# Patient Record
Sex: MALE | HISPANIC OR LATINO | Employment: FULL TIME | URBAN - METROPOLITAN AREA
[De-identification: names, ages, dates, MRNs, and addresses within clinical notes are randomized per-mention and may not be internally consistent; named-entity substitution may affect disease eponyms.]

---
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
## 2019-02-14 ENCOUNTER — HOSPITAL ENCOUNTER (EMERGENCY)
Facility: MEDICAL CENTER | Age: 19
End: 2019-02-14
Attending: EMERGENCY MEDICINE
Payer: COMMERCIAL

## 2019-02-14 ENCOUNTER — APPOINTMENT (OUTPATIENT)
Dept: RADIOLOGY | Facility: MEDICAL CENTER | Age: 19
End: 2019-02-14
Attending: EMERGENCY MEDICINE
Payer: COMMERCIAL

## 2019-02-14 VITALS
TEMPERATURE: 97.3 F | HEIGHT: 65 IN | OXYGEN SATURATION: 99 % | SYSTOLIC BLOOD PRESSURE: 134 MMHG | HEART RATE: 58 BPM | RESPIRATION RATE: 16 BRPM | BODY MASS INDEX: 31.65 KG/M2 | DIASTOLIC BLOOD PRESSURE: 76 MMHG | WEIGHT: 190 LBS
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
## 2019-02-14 PROCEDURE — 70487 CT MAXILLOFACIAL W/DYE: CPT

## 2019-02-14 PROCEDURE — 307744 HCHG RED TRAUMA TEAM SERVICES

## 2019-02-14 PROCEDURE — 70250 X-RAY EXAM OF SKULL: CPT

## 2019-02-14 PROCEDURE — 700117 HCHG RX CONTRAST REV CODE 255: Performed by: EMERGENCY MEDICINE

## 2019-02-14 PROCEDURE — 99284 EMERGENCY DEPT VISIT MOD MDM: CPT

## 2019-02-14 RX ORDER — AMOXICILLIN AND CLAVULANATE POTASSIUM 875; 125 MG/1; MG/1
1 TABLET, FILM COATED ORAL 2 TIMES DAILY
Qty: 6 TAB | Refills: 0 | Status: SHIPPED | OUTPATIENT
Start: 2019-02-14 | End: 2019-02-17

## 2019-02-14 RX ADMIN — IOHEXOL 80 ML: 350 INJECTION, SOLUTION INTRAVENOUS at 20:00
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
## 2019-02-15 NOTE — ED NOTES
Patient stable w no ss of distress at this time. Discharge instructions reviewed and understanding verbalized. abx ointment placed to wound per MD order. Ready for discharge.
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
## 2021-05-28 ENCOUNTER — HOSPITAL ENCOUNTER (EMERGENCY)
Facility: MEDICAL CENTER | Age: 21
End: 2021-05-28
Attending: EMERGENCY MEDICINE
Payer: COMMERCIAL

## 2021-05-28 VITALS
RESPIRATION RATE: 18 BRPM | SYSTOLIC BLOOD PRESSURE: 135 MMHG | TEMPERATURE: 97.7 F | BODY MASS INDEX: 23.77 KG/M2 | HEART RATE: 85 BPM | WEIGHT: 147.93 LBS | HEIGHT: 66 IN | DIASTOLIC BLOOD PRESSURE: 74 MMHG | OXYGEN SATURATION: 98 %

## 2021-05-28 DIAGNOSIS — S61.012A LACERATION OF LEFT THUMB WITHOUT FOREIGN BODY WITHOUT DAMAGE TO NAIL, INITIAL ENCOUNTER: ICD-10-CM

## 2021-05-28 PROCEDURE — 90471 IMMUNIZATION ADMIN: CPT

## 2021-05-28 PROCEDURE — 99282 EMERGENCY DEPT VISIT SF MDM: CPT

## 2021-05-28 PROCEDURE — 700111 HCHG RX REV CODE 636 W/ 250 OVERRIDE (IP): Performed by: EMERGENCY MEDICINE

## 2021-05-28 PROCEDURE — 90715 TDAP VACCINE 7 YRS/> IM: CPT | Performed by: EMERGENCY MEDICINE

## 2021-05-28 PROCEDURE — 304999 HCHG REPAIR-SIMPLE/INTERMED LEVEL 1

## 2021-05-28 PROCEDURE — 303747 HCHG EXTRA SUTURE

## 2021-05-28 PROCEDURE — 700101 HCHG RX REV CODE 250: Performed by: EMERGENCY MEDICINE

## 2021-05-28 RX ORDER — LIDOCAINE HYDROCHLORIDE 10 MG/ML
20 INJECTION, SOLUTION INFILTRATION; PERINEURAL ONCE
Status: COMPLETED | OUTPATIENT
Start: 2021-05-28 | End: 2021-05-28

## 2021-05-28 RX ADMIN — LIDOCAINE HYDROCHLORIDE 20 ML: 10 INJECTION, SOLUTION INFILTRATION; PERINEURAL at 10:15

## 2021-05-28 RX ADMIN — CLOSTRIDIUM TETANI TOXOID ANTIGEN (FORMALDEHYDE INACTIVATED), CORYNEBACTERIUM DIPHTHERIAE TOXOID ANTIGEN (FORMALDEHYDE INACTIVATED), BORDETELLA PERTUSSIS TOXOID ANTIGEN (GLUTARALDEHYDE INACTIVATED), BORDETELLA PERTUSSIS FILAMENTOUS HEMAGGLUTININ ANTIGEN (FORMALDEHYDE INACTIVATED), BORDETELLA PERTUSSIS PERTACTIN ANTIGEN, AND BORDETELLA PERTUSSIS FIMBRIAE 2/3 ANTIGEN 0.5 ML: 5; 2; 2.5; 5; 3; 5 INJECTION, SUSPENSION INTRAMUSCULAR at 10:33

## 2021-05-28 NOTE — ED PROVIDER NOTES
"ED Provider Note    Scribed for Tses Ledbetter M.D. by Jeanmarie Ellis. 5/28/2021, 9:49 AM.    Primary care provider: None noted  Means of arrival: Private Vehicle  History obtained from: Patient  History limited by: None    CHIEF COMPLAINT  Chief Complaint   Patient presents with    Hand Laceration       HPI  Jacobo Fletcher is a 21 y.o. male who presents to the Emergency Department for evaluation of acute left thumb laceration onset this morning. He was cutting some wires when he cut too far and sliced into his left thumb. He is able to move the finger well with minimal pain. He denies any numbness or tingling. The patient reports no other associated symptoms. Negative for fever, chills, numbness, tingling, decreased sensation, or active bleeding. No alleviating or exacerbating factors identified by the patient. The patient denies any history of diabetes. He is unsure when his last tetanus shot was.     REVIEW OF SYSTEMS  Pertinent positives include left thumb laceration. Pertinent negatives include no fever, chills, numbness, tingling, decreased sensation, or active bleeding.      PAST MEDICAL HISTORY   None chronic    SURGICAL HISTORY  patient denies any surgical history    SOCIAL HISTORY  Social History     Tobacco Use    Smoking status: Never Smoker    Smokeless tobacco: Never Used   Substance Use Topics    Alcohol use: Yes    Drug use: No      Social History     Substance and Sexual Activity   Drug Use No       FAMILY HISTORY  None pertinent    CURRENT MEDICATIONS  Home Medications       Reviewed by Andressa Manuel R.N. (Registered Nurse) on 05/28/21 at 0934  Med List Status: Not Addressed     Medication Last Dose Status        Patient Sj Taking any Medications                           ALLERGIES  No Known Allergies    PHYSICAL EXAM  VITAL SIGNS: /84   Pulse 99   Temp 36.6 °C (97.8 °F) (Temporal)   Resp 20   Ht 1.676 m (5' 6\")   Wt 67.1 kg (147 lb 14.9 oz)   SpO2 98%   BMI 23.88 kg/m² "     Constitutional: Alert in no apparent distress.  HENT: Normocephalic, Bilateral external ears normal. Nose normal.   Eyes: Pupils are equal and reactive. Conjunctiva normal, non-icteric.   Thorax & Lungs: Easy unlabored respirations  Abdomen:  No gross signs of peritonitis, no pain with movement   Skin: Visualized skin is  Dry, No erythema, No rash. Left thumb on the lateral aspect there is a 2 cm gaping laceration, no active bleeding, no foreign body, no joint involvement, no tendon laceration  Extremities:   No edema, No asymmetry, normal range of motion of thumb, intact sensation, good capillary refill  Neurologic: Alert, Grossly non-focal.   Psychiatric: Affect and Mood normal    DIAGNOSTIC STUDIES / PROCEDURES    Laceration Repair Procedure    Indication: Laceration    Location/Description: Left Thumb    Procedure: The patient was placed in the appropriate position and anesthesia around the laceration was obtained by infiltration using 3 cc of 1% Lidocaine without epinephrine. The area was then cleansed with betadine and draped in a sterile fashion. The laceration was closed with 5-0 Ethilon using interrupted sutures. There were no additional lacerations requiring repair. The wound area was then dressed with a sterile dressing.      Total repaired wound length: 2 cm.     Other Items: Suture count: 2    The patient tolerated the procedure well.    Complications: None    COURSE & MEDICAL DECISION MAKING  Nursing notes, VS, PMSFHx reviewed in chart.    Patient presents with a laceration to the thumb.  There is no evidence of joint involvement.  No foreign body.  No evidence of a tendon laceration.  Neurovascularly intact.    9:49 AM - Patient seen and examined at bedside. Patient will be treated with Adacel 0.5 mL and lidocaine 1%. Discussed plan to repair laceration with sutures.  He is understanding and agreeable with this plan.     10:20 AM - Laceration repair was performed at this time. See procedure note  above for further details.     10:30 AM - I informed the patient of my plan for discharge, I provided precautions to return for any new or worsening symptoms. Laceration care instructions were provided at this time. He should keep the area clean and dry. The patient verbalized understanding of discharge precautions and is agreeable to my plan.      The patient will return for new or worsening symptoms and is stable at the time of discharge.    DISPOSITION:  Patient will be discharged home in stable condition.    FOLLOW UP:  [facility], Emergency Dept  [address] [place] [id number] [phone]  In 1 week  For suture removal      OUTPATIENT MEDICATIONS:  There are no discharge medications for this patient.        FINAL IMPRESSION  1. Laceration of left thumb without foreign body without damage to nail, initial encounter          Jeanmarie BERNAL (Candiibdebra), am scribing for, and in the presence of, Tess Ledbetter M.D..    Electronically signed by: Jeanmarie Ellis (Scribe), 5/28/2021    ITess M.D. personally performed the services described in this documentation, as scribed by Jeanmarie Ellis in my presence, and it is both accurate and complete.    E.     The note accurately reflects work and decisions made by me.  Tess Ledbetter M.D.  5/28/2021  3:32 PM

## 2021-05-28 NOTE — DISCHARGE INSTRUCTIONS
Keep the wound clean and covered while at work.  Clean it with soap and water.  Avoid soaking the wound.  You can put Neosporin ointment on the laceration.  Any redness drainage fever or concerns return.

## 2021-05-28 NOTE — ED NOTES
Reviewed discharge instructions with pt. Verbalized understanding. Pt ambulatory out of ER with steady gait.

## 2021-06-30 NOTE — ED NOTES
Tetanus given.    Burow's Graft Text: The defect edges were debeveled with a #15 scalpel blade.  Given the location of the defect, shape of the defect, the proximity to free margins and the presence of a standing cone deformity a Burow's skin graft was deemed most appropriate. The standing cone was removed and this tissue was then trimmed to the shape of the primary defect. The adipose tissue was also removed until only dermis and epidermis were left.  The skin margins of the secondary defect were undermined to an appropriate distance in all directions utilizing iris scissors.  The secondary defect was closed with interrupted buried subcutaneous sutures.  The skin edges were then re-apposed with running  sutures.  The skin graft was then placed in the primary defect and oriented appropriately.